# Patient Record
Sex: FEMALE | Race: WHITE
[De-identification: names, ages, dates, MRNs, and addresses within clinical notes are randomized per-mention and may not be internally consistent; named-entity substitution may affect disease eponyms.]

---

## 2018-08-22 ENCOUNTER — HOSPITAL ENCOUNTER (EMERGENCY)
Dept: HOSPITAL 45 - C.EDB | Age: 83
LOS: 1 days | Discharge: HOME | End: 2018-08-23
Payer: COMMERCIAL

## 2018-08-22 VITALS — TEMPERATURE: 97.7 F

## 2018-08-22 VITALS
BODY MASS INDEX: 26.52 KG/M2 | WEIGHT: 149.69 LBS | BODY MASS INDEX: 26.52 KG/M2 | HEIGHT: 62.99 IN | HEIGHT: 62.99 IN | WEIGHT: 149.69 LBS

## 2018-08-22 DIAGNOSIS — Z87.891: ICD-10-CM

## 2018-08-22 DIAGNOSIS — R04.0: Primary | ICD-10-CM

## 2018-08-22 DIAGNOSIS — M17.0: ICD-10-CM
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
## 2018-08-22 DIAGNOSIS — S80.02XA: ICD-10-CM
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
## 2018-08-22 DIAGNOSIS — F03.90: ICD-10-CM

## 2018-08-22 DIAGNOSIS — S02.2XXA: ICD-10-CM

## 2018-08-23 VITALS — DIASTOLIC BLOOD PRESSURE: 84 MMHG | HEART RATE: 71 BPM | SYSTOLIC BLOOD PRESSURE: 165 MMHG | OXYGEN SATURATION: 98 %

## 2018-08-23 VITALS — OXYGEN SATURATION: 98 %

## 2018-08-23 LAB
ALBUMIN SERPL-MCNC: 3.6 GM/DL (ref 3.4–5)
ALP SERPL-CCNC: 60 U/L (ref 45–117)
ALT SERPL-CCNC: 23 U/L (ref 12–78)
AST SERPL-CCNC: 14 U/L (ref 15–37)
BASOPHILS # BLD: 0.03 K/UL (ref 0–0.2)
BASOPHILS NFR BLD: 0.3 %
BUN SERPL-MCNC: 27 MG/DL (ref 7–18)
CALCIUM SERPL-MCNC: 8.6 MG/DL (ref 8.5–10.1)
CO2 SERPL-SCNC: 27 MMOL/L (ref 21–32)
CREAT SERPL-MCNC: 0.79 MG/DL (ref 0.6–1.2)
EOS ABS #: 0.15 K/UL (ref 0–0.5)
EOSINOPHIL NFR BLD AUTO: 224 K/UL (ref 130–400)
GLUCOSE SERPL-MCNC: 132 MG/DL (ref 70–99)
HCT VFR BLD CALC: 38.2 % (ref 37–47)
HGB BLD-MCNC: 12.6 G/DL (ref 12–16)
IG#: 0 K/UL (ref 0–0.02)
IMM GRANULOCYTES NFR BLD AUTO: 35 %
LYMPHOCYTES # BLD: 3.14 K/UL (ref 1.2–3.4)
MCH RBC QN AUTO: 29.5 PG (ref 25–34)
MCHC RBC AUTO-ENTMCNC: 33 G/DL (ref 32–36)
MCV RBC AUTO: 89.5 FL (ref 80–100)
MONO ABS #: 0.54 K/UL (ref 0.11–0.59)
MONOCYTES NFR BLD: 6 %
NEUT ABS #: 5.12 K/UL (ref 1.4–6.5)
NEUTROPHILS # BLD AUTO: 1.7 %
NEUTROPHILS NFR BLD AUTO: 57 %
PMV BLD AUTO: 9.7 FL (ref 7.4–10.4)
POTASSIUM SERPL-SCNC: 4.2 MMOL/L (ref 3.5–5.1)
PROT SERPL-MCNC: 6.6 GM/DL (ref 6.4–8.2)
RED CELL DISTRIBUTION WIDTH CV: 12.7 % (ref 11.5–14.5)
RED CELL DISTRIBUTION WIDTH SD: 41.1 FL (ref 36.4–46.3)
SODIUM SERPL-SCNC: 144 MMOL/L (ref 136–145)
WBC # BLD AUTO: 8.98 K/UL (ref 4.8–10.8)

## 2018-08-23 NOTE — DIAGNOSTIC IMAGING REPORT
SINGLE VIEW PELVIS



CLINICAL HISTORY: Fall.



FINDINGS: An AP pelvic radiograph is obtained. No prior studies are available

for comparison at the time of dictation. The skeletal structures are osteopenic.

There is no radiographic evidence of acute fracture involving the hips or bony

pelvis. Mild to moderate arthritic change and joint space narrowing is present

in the hips. Enthesophytes arise from the anterior superior iliac spine

bilaterally. Sclerotic degenerative change is noted in the sacroiliac joints and

symphysis pubis. Moderate to advanced lumbosacral spondylosis is partially

visualized. No bowel obstruction is seen. Moderate colonic fecal retention is

observed. The overlying soft tissues are within normal limits.



IMPRESSION: Osteopenia and degenerative change as above. No acute fracture is

identified.







Electronically signed by:  Rick Buckley M.D.

8/23/2018 7:14 AM



Dictated Date/Time:  8/23/2018 7:13 AM

## 2018-08-23 NOTE — EMERGENCY ROOM VISIT NOTE
History


Report prepared by Teresa:  Krishna Goff


Under the Supervision of:  Dr. Garret Mchugh M.D.


First contact with patient:  23:40


Chief Complaint:  FALL


Stated Complaint:  FELL, POSSIBLE BROKEN NOSE





History of Present Illness


The patient is an 86 year old female who presents to the Emergency Room with 

complaints of constant knee pain following a fall occurring today. Per daughter

, the patient's fall was witnessed on a house security camera. She notes that 

it looked like the patient got up from the couch, and then fell forward over 

the coffee table. She reports that the patient then landed on her knees on the 

hardwood floor. She states that the patient was able to get up after she fell 

and has been able to walk. She notes that she has a previous history of 

arthritis in her knees, and she reports that the patient received steroid shots 

a few days ago. She states that there was a lot of blood on the ground and she 

notes that the patient's nose was bleeding when they arrived home. The patient 

denies any hip pain and abdominal pain. HPI limited secondary to dementia.





   Source of History:  patient, family (daughter)


   History Limited By:  dementia


   Onset:  today


   Position:  knee (bilateral)


   Timing:  constant


   Associated Symptoms:  No abdominal pain


Note:


Per daughter, the patient's nose was bleeding. The patient denies any hip pain.





Review of Systems


See HPI for pertinent positives & negatives. A total of 10 systems reviewed and 

were otherwise negative.





Past Medical & Surgical


Medical Problems:


(1) Arthritis








Family History


No pertinent family history stated.





Social History


Smoking Status:  Former Smoker


Marital Status:  


Occupation Status:  retired





Allergies


Coded Allergies:  


     No Known Allergies (Unverified , 8/22/18)





Physical Exam


Vital Signs











  Date Time  Temp Pulse Resp B/P (MAP) Pulse Ox O2 Delivery O2 Flow Rate FiO2


 


8/23/18 01:40  71 16 165/84 98 Room Air  


 


8/23/18 01:04  71      


 


8/23/18 01:03     98 Room Air  


 


8/22/18 23:35 36.5 76 20 163/84 92 Room Air  











Physical Exam


GENERAL: Awake, alert, well-appearing, in no acute distress


HENT: Normocephalic, atraumatic. Oropharynx unremarkable. Nares clear, no blood 

in back of throat. 


EYES: Normal conjunctiva. Sclera non-icteric.


NECK: Supple. No nuchal rigidity. FROM. No JVD.


RESPIRATORY: Clear to auscultation.


CARDIAC: Regular rate, normal rhythm. Extremities warm and well perfused. 

Pulses equal.


ABDOMEN: Soft, non-distended. No tenderness to palpation. No rebound or 

guarding. No masses.


RECTAL: Deferred.


MUSCULOSKELETAL: Chest examination reveals no tenderness. The back is 

symmetrical on inspection without obvious abnormality. There is no CVA 

tenderness to palpation. No joint edema. 


LOWER EXTREMITIES: Calves are equal size bilaterally and non-tender. No edema. 

No discoloration. 2 in x 2 in fresh contusion to left knee cap.


NEURO: Normal sensorium. No sensory or motor deficits noted. 


SKIN: No rash or jaundice noted.





Medical Decision & Procedures


ER Provider


Diagnostic Interpretation:


Radiology results as stated below per my review and interpretation:





1 VIEW CHEST X-RAY:


Chronic changes noted. 


No evidence of pneumonia, congestion, pneumothorax, and fractures.





LEFT KNEE X-RAY:


No fractures, dislocations, or subluxations.





RIGHT KNEE X-RAY:


No fractures, dislocations, or subluxations.





PELVIS X-RAY:


No fractures, dislocations, or subluxations.





Radiology results as stated below per my review and radiologist interpretation:





CT FACIAL:


Nondisplaced fracture of the right nasal bone and mid nasal bone.


No other fractures.


Periapical lucencies in the left maxillary central incisor and right lateral 

incisor could indicate dental abscesses.


Radiologist: Jerilyn Amezcua MD. 





CT HEAD:


No acute intracranial hemorrhage or extra-axial fluid collection. No calvarial 

fractures.


Radiologist: Jerilyn Amezcua MD.





Laboratory Results


8/23/18 01:00








Red Blood Count 4.27, Mean Corpuscular Volume 89.5, Mean Corpuscular Hemoglobin 

29.5, Mean Corpuscular Hemoglobin Concent 33.0, Mean Platelet Volume 9.7, 

Neutrophils (%) (Auto) 57.0, Lymphocytes (%) (Auto) 35.0, Monocytes (%) (Auto) 

6.0, Eosinophils (%) (Auto) 1.7, Basophils (%) (Auto) 0.3, Neutrophils # (Auto) 

5.12, Lymphocytes # (Auto) 3.14, Monocytes # (Auto) 0.54, Eosinophils # (Auto) 

0.15, Basophils # (Auto) 0.03





8/23/18 01:00

















Test


  8/23/18


00:58 8/23/18


01:00


 


Bedside Glucose


  123 mg/dl


(70-90) 


 


 


White Blood Count


  


  8.98 K/uL


(4.8-10.8)


 


Red Blood Count


  


  4.27 M/uL


(4.2-5.4)


 


Hemoglobin


  


  12.6 g/dL


(12.0-16.0)


 


Hematocrit  38.2 % (37-47) 


 


Mean Corpuscular Volume


  


  89.5 fL


()


 


Mean Corpuscular Hemoglobin


  


  29.5 pg


(25-34)


 


Mean Corpuscular Hemoglobin


Concent 


  33.0 g/dl


(32-36)


 


Platelet Count


  


  224 K/uL


(130-400)


 


Mean Platelet Volume


  


  9.7 fL


(7.4-10.4)


 


Neutrophils (%) (Auto)  57.0 % 


 


Lymphocytes (%) (Auto)  35.0 % 


 


Monocytes (%) (Auto)  6.0 % 


 


Eosinophils (%) (Auto)  1.7 % 


 


Basophils (%) (Auto)  0.3 % 


 


Neutrophils # (Auto)


  


  5.12 K/uL


(1.4-6.5)


 


Lymphocytes # (Auto)


  


  3.14 K/uL


(1.2-3.4)


 


Monocytes # (Auto)


  


  0.54 K/uL


(0.11-0.59)


 


Eosinophils # (Auto)


  


  0.15 K/uL


(0-0.5)


 


Basophils # (Auto)


  


  0.03 K/uL


(0-0.2)


 


RDW Standard Deviation


  


  41.1 fL


(36.4-46.3)


 


RDW Coefficient of Variation


  


  12.7 %


(11.5-14.5)


 


Immature Granulocyte % (Auto)  0.0 % 


 


Immature Granulocyte # (Auto)


  


  0.00 K/uL


(0.00-0.02)


 


Anion Gap


  


  8.0 mmol/L


(3-11)


 


Est Creatinine Clear Calc


Drug Dose 


  47.3 ml/min 


 


 


Estimated GFR (


American) 


  78.6 


 


 


Estimated GFR (Non-


American 


  67.8 


 


 


BUN/Creatinine Ratio  34.7 (10-20) 


 


Calcium Level


  


  8.6 mg/dl


(8.5-10.1)


 


Total Bilirubin


  


  0.7 mg/dl


(0.2-1)


 


Direct Bilirubin


  


  0.2 mg/dl


(0-0.2)


 


Aspartate Amino Transf


(AST/SGOT) 


  14 U/L (15-37) 


 


 


Alanine Aminotransferase


(ALT/SGPT) 


  23 U/L (12-78) 


 


 


Alkaline Phosphatase


  


  60 U/L


()


 


Total Protein


  


  6.6 gm/dl


(6.4-8.2)


 


Albumin


  


  3.6 gm/dl


(3.4-5.0)





Labs reviewed by ED physician.





ED Course


0027: Past medical records reviewed. The patient was evaluated in room B3. A 

complete history and physical examination was performed. 





0148: Upon reexamination the patient is stable. I discussed results and 

treatment plan with the patient. She verbalizes agreement and understanding. 

The patient is ready for discharge.





Medical Decision


Differential diagnosis:


Etiologies such as fracture, dislocation, intra-abdominal, pneumothorax, 

intrathoracic , intracranial, neurologic, as well as other traumatic 

pathologies were entertained.





This is an 86-year-old female that presents emergency department during a 

period of high volume and high acuity complaining of fall at home.  Upon 

arrival to the emergency department the patient is complaining of a contusion 

to her left knee.  The patient also had a nosebleed however she currently is 

not bleeding.  Family is concerned that the patient may have lost a significant 

amount of blood.  For this reason an IV was established and his CBC was 

checked.  Patient's hemoglobin was found to be stable at 12.6.  X-rays of the 

knees pelvis and chest do not show any evidence of fracture dislocation or 

subluxation.  CAT scan of the head and face is only concerning for broken nasal 

bone.  Based on these findings I feel the patient can be safely discharged 

home.  Family does not wish was to get a urine sample.  She was ambulated by 

nursing staff and felt to do very well.  She was released to her family.





Medication Reconcilliation


Current Medication List:  was personally reviewed by me





Blood Pressure Screening


Patient's blood pressure:  Elevated blood pressure


Blood pressure disposition:  Referred to PCP





Impression





 Primary Impression:  


 Fall


 Additional Impressions:  


 Epistaxis


 Knee contusion





Scribe Attestation


The scribe's documentation has been prepared under my direction and personally 

reviewed by me in its entirety. I confirm that the note above accurately 

reflects all work, treatment, procedures, and medical decision making performed 

by me.





Departure Information


Dispostion


Home / Self-Care





Referrals


Parker Fu M.D. (PCP)





Forms


HOME CARE DOCUMENTATION FORM,                                                 

               IMPORTANT VISIT INFORMATION





Patient Instructions


My [facility]





Additional Instructions





Follow up with Dr Blanchard's office for continued knee pain





Take 1000 mg Tylenol for pain 





You have been examined and treated today on an emergency basis only. This is 

not a substitute for, or an effort to provide, complete comprehensive medical 

care. It is impossible to recognize and treat all injuries or illnesses in a 

single emergency department visit. It is therefore important that you follow up 

closely with Dr Fu.  Call as soon as possible for an appointment.  





Thank you for your time and consideration.  I look forward to speaking with you 

again soon.  Please don't hesitate to call us if you have any questions.





Problem Qualifiers








 Primary Impression:  


 Fall


 Encounter type:  initial encounter  Qualified Codes:  W19.XXXA - Unspecified 

fall, initial encounter


 Additional Impressions:  


 Knee contusion


 Encounter type:  initial encounter  Laterality:  left  Qualified Codes:  

S80.02XA - Contusion of left knee, initial encounter

## 2018-08-23 NOTE — DIAGNOSTIC IMAGING REPORT
SINGLE VIEW CHEST



CLINICAL HISTORY:  Fall.



FINDINGS: An AP, portable, upright chest radiograph is obtained. No prior

studies are available for comparison at the time of dictation.  The examination

is degraded by portable technique and apical lordotic positioning.  The

cardiomediastinal silhouette is unremarkable noting atherosclerotic

calcification of the thoracic aorta. The lungs and pleural spaces are clear. No

pneumothorax is seen. The skeletal structures are osteopenic. The bony thorax is

grossly intact. Degenerative change is seen throughout the thoracic spine.



IMPRESSION: No acute cardiopulmonary abnormality.







Electronically signed by:  Rick Buckley M.D.

8/23/2018 7:13 AM



Dictated Date/Time:  8/23/2018 7:12 AM

## 2018-08-23 NOTE — DIAGNOSTIC IMAGING REPORT
RIGHT KNEE 2 VIEWS



CLINICAL HISTORY: Fall with right knee pain.



FINDINGS: AP and crosstable lateral views of the right knee are obtained. No

prior studies are available for comparison at the time of dictation. The

skeletal structures are osteopenic. No fracture is seen. There is moderate

tricompartmental degenerative joint space narrowing, greatest in the medial and

patellofemoral compartments. There are patellar enthesophytes, marginal

osteophytes, and degenerative beaking of the tibial spine. No joint effusion is

identified. Mild soft tissue swelling is seen along the medial aspect of the

knee.



IMPRESSION:



1. Mild soft tissue swelling with no radiographic evidence of acute fracture.



2. Osteopenia and degenerative change as above.







Electronically signed by:  Rick Buckley M.D.

8/23/2018 7:10 AM



Dictated Date/Time:  8/23/2018 7:09 AM

## 2018-08-23 NOTE — DIAGNOSTIC IMAGING REPORT
LEFT KNEE 2 VIEWS



CLINICAL HISTORY: Fall with left knee pain.



FINDINGS: AP and crosstable lateral views of the left knee are obtained. No

prior studies are available for comparison at the time of dictation. The

skeletal structures are osteopenic. No fracture is seen. There is moderate

tricompartmental degenerative joint space narrowing, greatest in the medial and

patellofemoral compartments. There are patellar enthesophytes, marginal

osteophytes, and degenerative beaking of the tibial spine. No large joint

effusion is identified. Mild soft tissue swelling is noted.



IMPRESSION:



1. Mild soft tissue swelling with no radiographic evidence of acute fracture.



2. Osteopenia and degenerative change as above.







Electronically signed by:  Rick Buckley M.D.

8/23/2018 7:12 AM



Dictated Date/Time:  8/23/2018 7:10 AM

## 2018-08-23 NOTE — DIAGNOSTIC IMAGING REPORT
MAXILLOFACIAL CT WITHOUT CONTRAST



CLINICAL HISTORY: Fall, epistaxis.    



COMPARISON STUDY:  None.



TECHNIQUE: A maxillofacial CT was performed without IV contrast. Coronal and

sagittal reformats were viewed.  A dose lowering technique was utilized adhering

to the principles of ALARA.





FINDINGS: There is no fracture within visualized portions of the upper cervical

spine. Alignment of the temporomandibular joints is anatomic. Scattered venous

gas is noted. Note is made of nondisplaced bilateral nasal bone fractures.

Orbital floors are intact. Mild ethmoid sinus mucosal thickening is noted.



IMPRESSION: 



Nondisplaced bilateral nasal bone fractures.







Electronically signed by:  Al Vargas M.D.

8/23/2018 7:23 AM



Dictated Date/Time:  8/23/2018 7:18 AM

## 2018-08-23 NOTE — DIAGNOSTIC IMAGING REPORT
CT OF THE HEAD WITHOUT CONTRAST



CLINICAL HISTORY: Fall.    



COMPARISON STUDY:  No previous studies for comparison. 



TECHNIQUE: Helical axial images of the head were obtained without IV contrast.

Automated exposure control was utilized for the study.  A dose lowering

technique was utilized adhering to the principles of ALARA.





FINDINGS: No acute intracranial hemorrhage, midline shift or mass effect is

present. Mild atrophy is noted. Ventricular system is unremarkable. Basilar

cisterns are patent. There are no extra axial collections. White matter

hypodensities suggest mild small vessel disease. There is no calvarial fracture.

Nasal bone fracture is better depicted on the maxillofacial CT. Please see that

report for further description. Scattered venous gas is incidentally noted.



IMPRESSION:  



1. No acute intracranial findings.



2. No calvarial fracture. 







Electronically signed by:  Al Vargas M.D.

8/23/2018 7:18 AM



Dictated Date/Time:  8/23/2018 7:16 AM